# Patient Record
Sex: MALE | Race: ASIAN | NOT HISPANIC OR LATINO | URBAN - METROPOLITAN AREA
[De-identification: names, ages, dates, MRNs, and addresses within clinical notes are randomized per-mention and may not be internally consistent; named-entity substitution may affect disease eponyms.]

---

## 2018-02-27 ENCOUNTER — OFFICE VISIT (OUTPATIENT)
Dept: FAMILY MEDICINE | Facility: CLINIC | Age: 23
End: 2018-02-27
Payer: COMMERCIAL

## 2018-02-27 VITALS
WEIGHT: 184.4 LBS | TEMPERATURE: 98.3 F | BODY MASS INDEX: 29.54 KG/M2 | SYSTOLIC BLOOD PRESSURE: 129 MMHG | HEART RATE: 89 BPM | DIASTOLIC BLOOD PRESSURE: 83 MMHG

## 2018-02-27 DIAGNOSIS — L30.9 DERMATITIS: Primary | ICD-10-CM

## 2018-02-27 RX ORDER — DIAPER,BRIEF,INFANT-TODD,DISP
EACH MISCELLANEOUS
Qty: 30 G | Refills: 0 | Status: SHIPPED | OUTPATIENT
Start: 2018-02-27 | End: 2018-05-09

## 2018-02-27 RX ORDER — CETIRIZINE HYDROCHLORIDE 10 MG/1
10 TABLET ORAL 2 TIMES DAILY PRN
Qty: 30 TABLET | Refills: 1 | Status: SHIPPED | OUTPATIENT
Start: 2018-02-27 | End: 2018-05-09

## 2018-02-27 NOTE — PROGRESS NOTES
Preceptor attestation:  Patient seen and discussed with the resident. Assessment and plan reviewed with resident and agreed upon.  Supervising physician: Hermann Muller  [facility]

## 2018-02-27 NOTE — PATIENT INSTRUCTIONS
Rash  1. Apply 1% hydrocortisone on the affected area  2. Zyrtec 10 mg, twice a day for itching   3. If the rash gets worse in week, please come back to the clinic for a biopsy

## 2018-02-27 NOTE — MR AVS SNAPSHOT
After Visit Summary   2018    Richmond Smith    MRN: [id number]           Patient Information     Date Of Birth          1995        Visit Information        Provider Department      2018 1:50 PM Jose Gallegos,  [facility]        Today's Diagnoses     Dermatitis    -  1      Care Instructions    Rash  1. Apply 1% hydrocortisone on the affected area  2. Zyrtec 10 mg, twice a day for itching   3. If the rash gets worse in week, please come back to the clinic for a biopsy           Follow-ups after your visit        Follow-up notes from your care team     Return in about 1 week (around 3/6/2018).      Who to contact     Please call your clinic at [phone] to:    Ask questions about your health    Make or cancel appointments    Discuss your medicines    Learn about your test results    Speak to your doctor            Additional Information About Your Visit        MyChart Information     [employer] is an electronic gateway that provides easy, online access to your medical records. With [employer], you can request a clinic appointment, read your test results, renew a prescription or communicate with your care team.     To sign up for [employer] visit the website at [email or web address]   You will be asked to enter the access code listed below, as well as some personal information. Please follow the directions to create your username and password.     Your access code is: [email or web address]  Expires: 2018  2:52 PM     Your access code will  in 90 days. If you need help or a new code, please contact your [facility] or call [phone] for assistance.        Care EveryWhere ID     This is your Care EveryWhere ID. This could be used by other organizations to access your [facility] medical records  [id number]        Your Vitals Were     Pulse Temperature BMI (Body Mass Index)             89 98.3  F (36.8  C) (Oral) 29.54 kg/m2          Blood Pressure  from Last 3 Encounters:   02/27/18 129/83   03/11/15 106/73   08/01/14 109/68    Weight from Last 3 Encounters:   02/27/18 184 lb 6.4 oz (83.6 kg)   03/11/15 176 lb 5 oz (80 kg) (79 %)*   08/01/14 168 lb 3.2 oz (76.3 kg) (73 %)*     * Growth percentiles are based on [facility] 2-20 Years data.              Today, you had the following     No orders found for display         Today's Medication Changes          These changes are accurate as of 2/27/18  2:53 PM.  If you have any questions, ask your nurse or doctor.               Start taking these medicines.        Dose/Directions    cetirizine 10 MG tablet   Commonly known as:  zyrTEC   Used for:  Dermatitis   Started by:  Jose Gallegos DO        Dose:  10 mg   Take 1 tablet (10 mg) by mouth 2 times daily as needed for allergies   Quantity:  30 tablet   Refills:  1       hydrocortisone 1 % ointment   Used for:  Dermatitis   Started by:  Jose Gallegos DO        Apply sparingly to affected area three times daily for 14 days.   Quantity:  30 g   Refills:  0         Stop taking these medicines if you haven't already. Please contact your care team if you have questions.     methylPREDNISolone 4 MG tablet   Commonly known as:  MEDROL DOSEPAK   Stopped by:  Jose Gallegos DO                Where to get your medicines      These medications were sent to [facility] - [place], [place] - [address], [place] [address]     Phone:  [phone]     cetirizine 10 MG tablet    hydrocortisone 1 % ointment                Primary Care Provider Office Phone # Fax #    Shamir Richey [phone]       [facility] [address]  [facility] [address]        Equal Access to Services     SARAH HINDS AH: Daniel Sun, waaxda luqadaha, qaybta kaalmada adeegyada, navjot joyce. So [facility] [phone].    ATENCIÓN: Si habla español, tiene a viveros disposición servicios gratuitos de  asistencia lingüística. Bruno al [phone].    We comply with applicable federal civil rights laws and [place] laws. We do not discriminate on the basis of race, color, national origin, age, disability, sex, sexual orientation, or gender identity.            Thank you!     Thank you for choosing [facility]  for your care. Our goal is always to provide you with excellent care. Hearing back from our patients is one way we can continue to improve our services. Please take a few minutes to complete the written survey that you may receive in the mail after your visit with us. Thank you!             Your Updated Medication List - Protect others around you: Learn how to safely use, store and throw away your medicines at www.disposemymeds.org.          This list is accurate as of 2/27/18  2:53 PM.  Always use your most recent med list.                   Brand Name Dispense Instructions for use Diagnosis    cetirizine 10 MG tablet    zyrTEC    30 tablet    Take 1 tablet (10 mg) by mouth 2 times daily as needed for allergies    Dermatitis       hydrocortisone 1 % ointment     30 g    Apply sparingly to affected area three times daily for 14 days.    Dermatitis

## 2018-02-27 NOTE — PROGRESS NOTES
ASSESSMENT AND PLAN     This  22 year old male presents a diffuse pruritic rash on his neck, trunk and arms.     1. Dermatitis  Given the hx of new lotion use, his rash could be an allergic reaction to the new lotion.  Another possibility is pityriasis rosea given the only presenting symptom is pruritic rash. Guttate psoriasis is possible however he does not have any symptoms suggesting strep throat.  Instructed patient to stop using the new lotion and use hydrocortisone cream to the affected areas as well as Zyrtec for pruritus.  I asked patient to return in 1 week.  If rash is not improving we will consider biopsy at that time.  - apply 1% hydrocortisone cream to affected area  - zyrtec 10 mg BID for pruritis     I ended our visit today by discussing the patient's diagnoses and recommended treatment. Please refer to today's diagnoses and orders for further details. I briefly discussed the pathophysiology of these conditions and outlined their expected course. I discussed the warning symptoms and signs that indicate an atypical course that would need urgent or emergent care. I also discussed self care strategies for symptom relief. Patient voiced understanding of plan of care and was in full agreement to proceed as discussed.    RTC in 1 week for follow up or sooner if develops new or worsening symptoms.  Patient discussed and seen with Hermann Muller MD, attending physician who agrees with the plan.     I, María Winchester am acting as scribe for Dr. Jose Gallegos DO.    I, Dr. Jose Gallegos DO had María Winchester acting as scribe and the note accurately records my words and actions during this visit.    Jose Gallegos DO PGY-3  [facility]   Pager: [phone]         SUBJECTIVE   Hsa Moo is a 22 year old male with a no significant PMH who presents for a rash. The rash started 4-5 days ago on his chest and has now spread to his trunk, neck and arms. He  reports that the rash is mildly itchy but not painful. He denies any fever, chills, sore throat, any cold symptoms. He has been sexually active with a partner for a long time and occasionally uses condom. He states that his partner has not tested positive STDs.     He mentions that he bought a new lotion at a dollar store and used it once on his chest around the time that the rash started. He denies any new medications, new food, new detergents, or recent travel.     PMH, Medications and Allergies were reviewed and updated as needed.      REVIEW OF SYSTEMS   General: No fevers, chills  HEENT: No headache, vision changes, sore throat  CV: No chest pain or palpitations.  Resp: No shortness of breath.  No cough. No hemoptysis.  GI: No nausea, vomiting, constipation, diarrhea  : No dysuria, hematuria  Skin: Positive for rashes, lesions  MSK: No myalgias, arthralgias  Neuro: No headaches, weakness      Family and Social Hx     PMH: No past medical history on file.      PSH: No past surgical history on file.  SH:   Social History     Social History     Marital status: Single     Spouse name: N/A     Number of children: N/A     Years of education: N/A     Occupational History     Not on file.     Social History Main Topics     Smoking status: Never Smoker     Smokeless tobacco: Never Used     Alcohol use No     Drug use: No     Sexual activity: Not on file     Other Topics Concern     Not on file     Social History Narrative     FH: non-contributory           OBJECTIVE     Vitals:    02/27/18 1408   BP: 129/83   BP Location: Left arm   Patient Position: Sitting   Cuff Size: Adult Large   Pulse: 89   Temp: 98.3  F (36.8  C)   TempSrc: Oral   Weight: 184 lb 6.4 oz (83.6 kg)     Body mass index is 29.54 kg/(m^2).  Gen:  Well nourished and in NAD  HEENT: PERRL. EOMI, NP/OP pink and moist   Neck: supple, no lymphadenopathy appreciated  CV:  RRR  - no murmurs, rubs, or gallops. Good distal perfusion.  Pulm:  CTAB, no  wheezes/rales/rhonchi, good air entry, normal work of breathing  Extrem: No cyanosis, edema or clubbing.   Skin: Diffuse maculopapular rash on neck, trunk, arms bilaterally. A few hypopigmented spots on his abdomen.  No involvement on the palms or soles or mucous membranes.    Dragon Dictation software was used for this note.

## 2018-05-09 ENCOUNTER — OFFICE VISIT (OUTPATIENT)
Dept: FAMILY MEDICINE | Facility: CLINIC | Age: 23
End: 2018-05-09
Payer: COMMERCIAL

## 2018-05-09 VITALS
SYSTOLIC BLOOD PRESSURE: 112 MMHG | WEIGHT: 183 LBS | BODY MASS INDEX: 29.31 KG/M2 | HEART RATE: 61 BPM | TEMPERATURE: 97.7 F | DIASTOLIC BLOOD PRESSURE: 74 MMHG

## 2018-05-09 DIAGNOSIS — Z71.84 TRAVEL ADVICE ENCOUNTER: Primary | ICD-10-CM

## 2018-05-09 DIAGNOSIS — Z23 NEED FOR VACCINATION: ICD-10-CM

## 2018-05-09 RX ORDER — ATOVAQUONE AND PROGUANIL HYDROCHLORIDE 250; 100 MG/1; MG/1
1 TABLET, FILM COATED ORAL DAILY
Qty: 70 TABLET | Refills: 0 | Status: SHIPPED | OUTPATIENT
Start: 2018-05-09

## 2018-05-09 RX ORDER — LOPERAMIDE HYDROCHLORIDE 2 MG/1
TABLET ORAL
Qty: 30 TABLET | Refills: 0 | Status: SHIPPED | OUTPATIENT
Start: 2018-05-09

## 2018-05-09 NOTE — MR AVS SNAPSHOT
After Visit Summary   2018    Richmond Smith    MRN: [id number]           Patient Information     Date Of Birth          1995        Visit Information        Provider Department      2018 10:00 AM Hermann Muller MD [facility]        Today's Diagnoses     Travel advice encounter    -  1    Need for vaccination           Follow-ups after your visit        Who to contact     Please call your clinic at [phone] to:    Ask questions about your health    Make or cancel appointments    Discuss your medicines    Learn about your test results    Speak to your doctor            Additional Information About Your Visit        MyChart Information     [employer] is an electronic gateway that provides easy, online access to your medical records. With [employer], you can request a clinic appointment, read your test results, renew a prescription or communicate with your care team.     To sign up for [employer] visit the website at [email or web address]   You will be asked to enter the access code listed below, as well as some personal information. Please follow the directions to create your username and password.     Your access code is: [email or web address]  Expires: 2018  3:52 PM     Your access code will  in 90 days. If you need help or a new code, please contact your [facility] or call [phone] for assistance.        Care EveryWhere ID     This is your Care EveryWhere ID. This could be used by other organizations to access your [facility] medical records  [id number]        Your Vitals Were     Pulse Temperature BMI (Body Mass Index)             61 97.7  F (36.5  C) (Oral) 29.31 kg/m2          Blood Pressure from Last 3 Encounters:   18 112/74   18 129/83   03/11/15 106/73    Weight from Last 3 Encounters:   18 183 lb (83 kg)   18 184 lb 6.4 oz (83.6 kg)   03/11/15 176 lb 5 oz (80 kg) (79 %)*     * Growth percentiles are based on CDC  2-20 Years data.              We Performed the Following     ADMIN VACCINE, EACH ADDITIONAL     ADMIN VACCINE, INITIAL     TDAP VACCINE (BOOSTRIX)     TYPHOID VACCINE, IM          Today's Medication Changes          These changes are accurate as of 5/9/18 11:13 AM.  If you have any questions, ask your nurse or doctor.               Start taking these medicines.        Dose/Directions    atovaquone-proguanil 250-100 MG per tablet   Commonly known as:  MALARONE   Used for:  Travel advice encounter   Started by:  Hermann Muller MD        Dose:  1 tablet   Take 1 tablet by mouth daily Start 2 days before travel and continue 7 days after return.   Quantity:  70 tablet   Refills:  0       loperamide 2 MG tablet   Commonly known as:  IMODIUM A-D   Used for:  Travel advice encounter   Started by:  Hermann Muller MD        Take 2 tabs (4 mg) after first loose stool, and then take one tab (2 mg) after each diarrheal stool.  Max of 8 tabs (16 mg) per day.   Quantity:  30 tablet   Refills:  0            Where to get your medicines      [facility] [place], [place] - [address], [place] [address]     Phone:  [phone]     atovaquone-proguanil 250-100 MG per tablet    loperamide 2 MG tablet                Primary Care Provider Office Phone # Fax #    Shamir Richey  [phone]       [facility] [address]  [facility] [address]        Equal Access to Services     [facility] AH: Hadii william ku hadasho Soomaali, waaxda luqadaha, qaybta kaalmada adeegyada, navjot joyce. So [facility] [phone].    ATENCIÓN: Si habla español, tiene a viveros disposición servicios gratuitos de asistencia lingüística. Bruno al [phone].    We comply with applicable federal civil rights laws and [place] laws. We do not discriminate on the basis of race, color, national origin, age, disability, sex, sexual orientation, or  gender identity.            Thank you!     Thank you for choosing [facility]  for your care. Our goal is always to provide you with excellent care. Hearing back from our patients is one way we can continue to improve our services. Please take a few minutes to complete the written survey that you may receive in the mail after your visit with us. Thank you!             Your Updated Medication List - Protect others around you: Learn how to safely use, store and throw away your medicines at www.disposemymeds.org.          This list is accurate as of 5/9/18 11:13 AM.  Always use your most recent med list.                   Brand Name Dispense Instructions for use Diagnosis    atovaquone-proguanil 250-100 MG per tablet    MALARONE    70 tablet    Take 1 tablet by mouth daily Start 2 days before travel and continue 7 days after return.    Travel advice encounter       loperamide 2 MG tablet    IMODIUM A-D    30 tablet    Take 2 tabs (4 mg) after first loose stool, and then take one tab (2 mg) after each diarrheal stool.  Max of 8 tabs (16 mg) per day.    Travel advice encounter

## 2018-05-09 NOTE — PROGRESS NOTES
[facility] Travel Visit         Richmond Smith is a 22 year old male who presents for a pre-travel assessment visit.  He will be traveling to:    Destination(s):    The patient is traveling to [place].  He will be there between 5/17/2018 and 7/17/2018.  He is traveling with his sister.  He [place] planning [place] most [place] in the [place], but might make day trips out to more rural areas.  He will be visiting rural areas, staying and eating with local families, and eating raw food.  He will be visiting friends and relatives.      He has not had any problems with immunizations.  He does not have any allergies.  He has not had a reaction to any of the components of travel vaccine.  He is general completes a general medical history questionnaire with everything is negative.     He has no ongoing medical conditions, and does not take any medications regularly.  He has not previously traveled internationally.  He has never had a reaction to any of his any medications or immunizations.  He does not smoke and does not drink alcohol she does not plan on using recreational drugs.  He is not planning on having intercourse while out of the country.    Reason for travel: See above    Richmond Smith has completed the travel questionnaire and it is reviewed: YES  Are there special circumstances which place this traveler at higher risk (List if 'yes')?: YES    History reviewed. No pertinent past medical history.      No current outpatient prescriptions on file prior to visit.  No current facility-administered medications on file prior to visit.         Allergies   Allergen Reactions     Nka [No Known Allergies]        Immunizations, travel specific:  -- Yellow fever: Not Required, Not Recommended  -- Hep A: UTD with immunization   -- Hep B: UTD with immunization   -- Typhoid (IM: booster every 2 years; PO: booster every 5 years): Immunity status unknown  -- Rabies  (Data on the need for and timing of additional booster doses are  not available): Immunity status unknown  -- Meningococcal meningitis UTD with immunization    -- Japanese encephalitis (Data on the need for and timing of additional booster doses are not available):Immunity status unknown    Immunizations, routine adult:  -- Influenza UTD with immunization   -- Polio: UTD with immunization   -- Diphtheria, Tetanus & Pertussis (DTaP): Immunity status unknown  -- Measles/ Mumps/ Rubella: UTD with immunization   -- Varicella: UTD with immunization   -- Pneumococcal (PPSV23 (Pneumovax)): Immunity status unknown  -- Pneumococcal (PCV13 (Prevnar)): Immunity status unknown    Malaria prophylaxis:  Recommended (refer to [facility] for destination-specific recommendation) YES       Review of Systems:     CONSTITUTIONAL: NEGATIVE for fever, chills, change in weight  ENT/MOUTH: NEGATIVE for ear, mouth and throat problems  RESP: NEGATIVE for significant cough or SOB  CV: NEGATIVE for chest pain, palpitations or peripheral edema          Objective:     /74  Pulse 61  Temp 97.7  F (36.5  C) (Oral)  Wt 183 lb (83 kg)  BMI 29.31 kg/m2  Body mass index is 29.31 kg/(m^2).    GENERAL: healthy, alert, well nourished, well hydrated, no distress  MENTAL STATUS EXAM:  Appearance/Behavior: No apparent distress, Neatly groomed, Dressed appropriately for weather and Appears stated age  Speech: Normal  Mood/Affect: normal affect  Insight: Adequate         Assessment and Plan     Based on patient's past history, review of immunization and immunity status, planned itinerary and current recommendations, the following are recommended:    Travel advice encounter  -     atovaquone-proguanil (MALARONE) 250-100 MG per tablet; Take 1 tablet by mouth daily Start 2 days before travel and continue 7 days after return.  -     loperamide (IMODIUM A-D) 2 MG tablet; Take 2 tabs (4 mg) after first loose stool, and then take one tab (2 mg) after each diarrheal stool.  Max of 8 tabs (16 mg) per day.    Need for  vaccination  -     ADMIN VACCINE, INITIAL  -     ADMIN VACCINE, EACH ADDITIONAL  -     TDAP VACCINE (BOOSTRIX)  -     TYPHOID VACCINE, IM    Patient is given a copy of the [facility] traveller report as well as destination-specific recommendations on sun protection, avoiding insect bites and travel safety.      Options for treatment and/or follow-up care were reviewed with the patient. Hsa Moo was engaged and actively involved in the decision making process. He verbalized understanding of the options discussed and was satisfied with the final plan.      Hermann Muller MD

## 2022-10-02 ENCOUNTER — OFFICE VISIT (OUTPATIENT)
Dept: FAMILY MEDICINE | Facility: CLINIC | Age: 27
End: 2022-10-02
Payer: COMMERCIAL

## 2022-10-02 VITALS
TEMPERATURE: 97.3 F | SYSTOLIC BLOOD PRESSURE: 137 MMHG | DIASTOLIC BLOOD PRESSURE: 86 MMHG | OXYGEN SATURATION: 95 % | RESPIRATION RATE: 18 BRPM | HEART RATE: 82 BPM

## 2022-10-02 DIAGNOSIS — M54.41 ACUTE RIGHT-SIDED LOW BACK PAIN WITH RIGHT-SIDED SCIATICA: Primary | ICD-10-CM

## 2022-10-02 PROCEDURE — 96372 THER/PROPH/DIAG INJ SC/IM: CPT | Performed by: FAMILY MEDICINE

## 2022-10-02 PROCEDURE — 99204 OFFICE O/P NEW MOD 45 MIN: CPT | Mod: 25 | Performed by: FAMILY MEDICINE

## 2022-10-02 RX ORDER — CYCLOBENZAPRINE HCL 5 MG
5 TABLET ORAL 3 TIMES DAILY PRN
Qty: 30 TABLET | Refills: 0 | Status: SHIPPED | OUTPATIENT
Start: 2022-10-02

## 2022-10-02 RX ORDER — PREDNISONE 20 MG/1
40 TABLET ORAL ONCE
Status: COMPLETED | OUTPATIENT
Start: 2022-10-02 | End: 2022-10-02

## 2022-10-02 RX ORDER — HYDROCODONE BITARTRATE AND ACETAMINOPHEN 5; 325 MG/1; MG/1
1 TABLET ORAL EVERY 6 HOURS PRN
Qty: 6 TABLET | Refills: 0 | Status: SHIPPED | OUTPATIENT
Start: 2022-10-02 | End: 2022-10-05

## 2022-10-02 RX ORDER — PREDNISONE 20 MG/1
TABLET ORAL
Qty: 10 TABLET | Refills: 0 | Status: SHIPPED | OUTPATIENT
Start: 2022-10-02

## 2022-10-02 RX ORDER — KETOROLAC TROMETHAMINE 30 MG/ML
30 INJECTION, SOLUTION INTRAMUSCULAR; INTRAVENOUS ONCE
Status: COMPLETED | OUTPATIENT
Start: 2022-10-02 | End: 2022-10-02

## 2022-10-02 RX ADMIN — KETOROLAC TROMETHAMINE 30 MG: 30 INJECTION, SOLUTION INTRAMUSCULAR; INTRAVENOUS at 18:28

## 2022-10-02 RX ADMIN — PREDNISONE 40 MG: 20 TABLET ORAL at 18:31

## 2022-10-02 NOTE — PATIENT INSTRUCTIONS
Ice and heat for the back  Toradol given in the office today - do not take ibuprofen for next 12 hours.   May take paracetamol or acetaminophen as needed per bottle directions.     Prednisone 40mg given tonight. Continue 40mg daily for an additional 4-5 days. Take with food.     Cyclobenzaprine 5 mg every 8 hours as needed for muscle spasm. May make drowsy and so do not drive after    Vicoden (hydrocodone/acetaminophen) one tablet for severe pain if needed every 6 hours - only #6 given so use very sparingly. Do not drive after.     Follow up with primary care or spine doctor this week if not settling down.

## 2022-10-03 NOTE — PROGRESS NOTES
Assessment/Plan:   Acute right-sided low back pain with right-sided sciatica  Acute flare of his chronic/recurrent right-sided low back pain with right-sided sciatica 2 nights ago.  Unknown trigger.  There was no trauma however or event to suggest need for x-ray at this time.  30 mg Toradol was given in the office IM for pain control.  Exam consistent with musculoskeletal spasm and radicular nerve or sciatic irritation.  We will treat with ice, heat, prednisone, cyclobenzaprine.  First dose of prednisone was given in the office tonight since pharmacies are closed.  Very sparing use of Vicodin for pain and sleep.  Follow-up with primary care and spine clinic for ongoing management.  - ketorolac (TORADOL) injection 30 mg  - predniSONE (DELTASONE) tablet 40 mg  - cyclobenzaprine (FLEXERIL) 5 MG tablet; Take 1 tablet (5 mg) by mouth 3 times daily as needed for muscle spasms  Dispense: 30 tablet; Refill: 0  - HYDROcodone-acetaminophen (NORCO) 5-325 MG tablet; Take 1 tablet by mouth every 6 hours as needed for severe pain  Dispense: 6 tablet; Refill: 0  - predniSONE (DELTASONE) 20 MG tablet; 40mg daily for 5 days  Dispense: 10 tablet; Refill: 0    I discussed red flag symptoms, return precautions to clinic/ER and follow up care with patient/parent.  Expected clinical course, symptomatic cares advised. Questions answered. Patient/parent amenable with plan.    Ice and heat for the back  Toradol given in the office today - do not take ibuprofen for next 12 hours.   May take paracetamol or acetaminophen as needed per bottle directions.     Prednisone 40mg given tonight in the office since pharmacy closed. Continue 40mg daily for an additional 4-5 days. Take with food.     Cyclobenzaprine 5 mg every 8 hours as needed for muscle spasm. May make drowsy and so do not drive after    Vicoden (hydrocodone/acetaminophen) one tablet for severe pain if needed every 6 hours - only #6 given so use very sparingly. Do not drive after.      Follow up with primary care or spine doctor this week if not settling down.     Subjective:     Richmond Smith is a 26 year old male who presents for evaluation of a flare of low back pain.  He has history of chronic, recurrent low back problems but had been doing well until Friday night.  At that time he developed pain in the right side of his low back.  He is not quite sure what triggered the pain but has had worsening low back pain and spasm. No trauma, no apparent overuse or strain. He has been unable to find any comfortable position or sleep. With movement pain radiates up and down of the spine and at times into the buttock and leg.  No weakness.  No rash.  No bowel or bladder difficulty, no blood in his urine, no urinary urgency or dysuria.  No fever or chills.  No cough chest pain or shortness of breath.  No leg swelling or calf tenderness.  Presentation is similar to previous episodes of flare.  He has been followed in the past by the spine clinic.    Allergies   Allergen Reactions     Nka [No Known Allergies]      Current Outpatient Medications   Medication     cyclobenzaprine (FLEXERIL) 5 MG tablet     HYDROcodone-acetaminophen (NORCO) 5-325 MG tablet     predniSONE (DELTASONE) 20 MG tablet     atovaquone-proguanil (MALARONE) 250-100 MG per tablet     loperamide (IMODIUM A-D) 2 MG tablet     No current facility-administered medications for this visit.     Patient Active Problem List   Diagnosis     Chronic Hepatitis, B Virus       Objective:     /86 (BP Location: Right arm, Patient Position: Sitting, Cuff Size: Adult Regular)   Pulse 82   Temp 97.3  F (36.3  C) (Oral)   Resp 18   SpO2 95%     Physical    General Appearance: Alert, pleasant, no distress however extremely uncomfortable.  Small movements trigger spasms in the lower back.  Afebrile, vital signs stable  Head: Normocephalic, without obvious abnormality, atraumatic  Eyes: Conjunctivae are normal.  Neck: Normal range of motion  Lungs:  respirations unlabored  Back: No pain specifically with percussion of the spine, very tender tight muscles on the rightlow back.  Range of motion at the back extremely limited due to pain and spasm.  Worse pain on the right with leaning to the right side  Extremities: No lower extremity edema.  Bilateral lower extremity strength intact, some giveaway due to pain.  Positive straight leg raise on the right, sensation grossly normal.  Skin:  no rashes or lesions  Psychiatric: Patient has a normal mood and affect.       This note has been dictated in part using voice recognition software.  Any grammatical or context distortions are unintentional and inherent to the software.  Please feel free to contact me directly for clarification if needed.